# Patient Record
Sex: MALE | Race: WHITE | NOT HISPANIC OR LATINO | URBAN - METROPOLITAN AREA
[De-identification: names, ages, dates, MRNs, and addresses within clinical notes are randomized per-mention and may not be internally consistent; named-entity substitution may affect disease eponyms.]

---

## 2018-07-03 ENCOUNTER — OFFICE VISIT (OUTPATIENT)
Dept: FAMILY MEDICINE CLINIC | Facility: CLINIC | Age: 23
End: 2018-07-03
Payer: COMMERCIAL

## 2018-07-03 VITALS
WEIGHT: 162.5 LBS | OXYGEN SATURATION: 98 % | HEART RATE: 84 BPM | TEMPERATURE: 98.3 F | DIASTOLIC BLOOD PRESSURE: 68 MMHG | SYSTOLIC BLOOD PRESSURE: 120 MMHG | HEIGHT: 72 IN | BODY MASS INDEX: 22.01 KG/M2

## 2018-07-03 DIAGNOSIS — Z00.00 WELL ADULT EXAM: Primary | ICD-10-CM

## 2018-07-03 PROCEDURE — 99385 PREV VISIT NEW AGE 18-39: CPT | Performed by: FAMILY MEDICINE

## 2018-07-03 RX ORDER — FINASTERIDE 1 MG/1
1 TABLET, FILM COATED ORAL DAILY
COMMUNITY
Start: 2014-10-02

## 2018-07-03 RX ORDER — FLUTICASONE PROPIONATE 50 MCG
1 SPRAY, SUSPENSION (ML) NASAL DAILY
COMMUNITY

## 2018-07-03 NOTE — PATIENT INSTRUCTIONS

## 2018-07-04 PROBLEM — Z00.00 WELL ADULT EXAM: Status: ACTIVE | Noted: 2018-07-04

## 2018-07-04 NOTE — PROGRESS NOTES
Assessment/Plan:   Well exam    Diagnoses and all orders for this visit:    Well adult exam    Other orders  -     finasteride (PROPECIA) 1 MG tablet; Take 1 tablet by mouth daily  -     fluticasone (FLONASE) 50 mcg/act nasal spray; 1 spray into each nostril daily        Well exam:  Form filled out for school, up-to-date with immunizations  Subjective:  Chief Complaint   Patient presents with    Physical Exam     pt here for physical with forms for completion, pt is moving to [place] for at least the next year, pt will be leaving in the middle of Aug         Patient ID: Pavel Bruce is a 25 y o  male  Patient here to reestablish  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  He plans to live over there for at least a year and possibly go to school  He denies any symptoms today  He takes no medications on a regular basis  He denies any recent illnesses  He denies any changes to his medical history  He eats healthy and is very active  Review of Systems   Constitutional: Negative for fatigue and fever  HENT: Negative  Eyes: Negative  Respiratory: Negative  Negative for cough  Cardiovascular: Negative  Gastrointestinal: Negative  Endocrine: Negative  Genitourinary: Negative  Musculoskeletal: Negative  Skin: Negative  Allergic/Immunologic: Negative  Neurological: Negative  Psychiatric/Behavioral: Negative  The following portions of the patient's history were reviewed and updated as appropriate: allergies, current medications, past family history, past medical history, past social history, past surgical history and problem list     Objective:  Vitals:    07/03/18 1316   BP: 120/68   Pulse: 84   Temp: 98 3 °F (36 8 °C)   SpO2: 98%   Weight: 73 7 kg (162 lb 8 oz)   Height: 5' 11 5" (1 816 m)      Physical Exam   Constitutional: He is oriented to person, place, and time  He appears well-developed and well-nourished     HENT:   Head: Normocephalic and atraumatic  Cardiovascular: Normal rate, regular rhythm and normal heart sounds  Pulmonary/Chest: Effort normal and breath sounds normal    Abdominal: Soft  Bowel sounds are normal    Neurological: He is alert and oriented to person, place, and time  Skin: Skin is warm and dry  Psychiatric: He has a normal mood and affect  His behavior is normal  Judgment and thought content normal    Nursing note and vitals reviewed

## 2019-10-29 ENCOUNTER — TELEPHONE (OUTPATIENT)
Dept: FAMILY MEDICINE CLINIC | Facility: CLINIC | Age: 24
End: 2019-10-29

## 2020-01-14 ENCOUNTER — DOCUMENTATION (OUTPATIENT)
Dept: FAMILY MEDICINE CLINIC | Facility: CLINIC | Age: 25
End: 2020-01-14

## 2020-01-14 NOTE — PROGRESS NOTES
Patient called asking his immunization records to be sent to his new address, mailed to new address

## 2024-02-21 PROBLEM — Z00.00 WELL ADULT EXAM: Status: RESOLVED | Noted: 2018-07-04 | Resolved: 2024-02-21

## 2025-05-27 ENCOUNTER — TELEPHONE (OUTPATIENT)
Age: 30
End: 2025-05-27

## 2025-05-27 NOTE — TELEPHONE ENCOUNTER
[place] called stating that he would need his immunization records, stated he has family in the area and wanted to know if he could have one of them pick it up at the office.     Please advise.